# Patient Record
Sex: FEMALE | Race: WHITE
[De-identification: names, ages, dates, MRNs, and addresses within clinical notes are randomized per-mention and may not be internally consistent; named-entity substitution may affect disease eponyms.]

---

## 2017-05-16 ENCOUNTER — HOSPITAL ENCOUNTER (INPATIENT)
Dept: HOSPITAL 1 - ED | Age: 27
Discharge: LEFT BEFORE DISCHARGE | DRG: 225 | End: 2017-05-16
Attending: FAMILY MEDICINE | Admitting: FAMILY MEDICINE
Payer: COMMERCIAL

## 2017-05-16 ENCOUNTER — HOSPITAL ENCOUNTER (EMERGENCY)
Dept: HOSPITAL 26 - MED | Age: 27
Discharge: LEFT BEFORE DISCHARGE | End: 2017-05-16
Payer: COMMERCIAL

## 2017-05-16 VITALS — DIASTOLIC BLOOD PRESSURE: 66 MMHG | SYSTOLIC BLOOD PRESSURE: 104 MMHG

## 2017-05-16 VITALS — DIASTOLIC BLOOD PRESSURE: 63 MMHG | SYSTOLIC BLOOD PRESSURE: 115 MMHG

## 2017-05-16 VITALS — SYSTOLIC BLOOD PRESSURE: 115 MMHG | DIASTOLIC BLOOD PRESSURE: 63 MMHG

## 2017-05-16 VITALS — BODY MASS INDEX: 25.16 KG/M2 | WEIGHT: 160.3 LBS | HEIGHT: 67 IN

## 2017-05-16 VITALS — SYSTOLIC BLOOD PRESSURE: 101 MMHG | DIASTOLIC BLOOD PRESSURE: 58 MMHG

## 2017-05-16 VITALS — SYSTOLIC BLOOD PRESSURE: 104 MMHG | DIASTOLIC BLOOD PRESSURE: 66 MMHG

## 2017-05-16 VITALS — DIASTOLIC BLOOD PRESSURE: 71 MMHG | SYSTOLIC BLOOD PRESSURE: 113 MMHG

## 2017-05-16 VITALS — DIASTOLIC BLOOD PRESSURE: 70 MMHG | SYSTOLIC BLOOD PRESSURE: 116 MMHG

## 2017-05-16 VITALS — BODY MASS INDEX: 23.54 KG/M2 | WEIGHT: 150 LBS | HEIGHT: 67 IN

## 2017-05-16 DIAGNOSIS — Z53.21: ICD-10-CM

## 2017-05-16 DIAGNOSIS — F17.210: ICD-10-CM

## 2017-05-16 DIAGNOSIS — N17.0: ICD-10-CM

## 2017-05-16 DIAGNOSIS — K35.80: Primary | ICD-10-CM

## 2017-05-16 DIAGNOSIS — E87.6: ICD-10-CM

## 2017-05-16 DIAGNOSIS — F12.10: ICD-10-CM

## 2017-05-16 DIAGNOSIS — N39.0: ICD-10-CM

## 2017-05-16 DIAGNOSIS — R10.9: Primary | ICD-10-CM

## 2017-05-16 LAB
ALBUMIN SERPL-MCNC: 4.2 G/DL (ref 3.4–5)
ALP SERPL-CCNC: 85 U/L (ref 46–116)
ALT SERPL-CCNC: 14 U/L (ref 14–59)
AMPHETAMINES UR QL SCN: (no result)
AMYLASE SERPL-CCNC: 41 U/L (ref 25–115)
AST SERPL-CCNC: 12 U/L (ref 15–37)
BASOPHILS NFR BLD: 1.5 % (ref 0–2)
BILIRUB SERPL-MCNC: 0.62 MG/DL (ref 0.2–1)
BUN SERPL-MCNC: 11 MG/DL (ref 7–18)
CALCIUM SERPL-MCNC: 9.2 MG/DL (ref 8.5–10.1)
CHLORIDE SERPL-SCNC: 102 MMOL/L (ref 98–107)
CHOLEST SERPL-MCNC: 160 MG/DL (ref ?–200)
CHOLEST/HDLC SERPL: 1.9 MG/DL
CO2 SERPL-SCNC: 21.4 MMOL/L (ref 21–32)
CREAT SERPL-MCNC: 0.8 MG/DL (ref 0.6–1)
ERYTHROCYTE [DISTWIDTH] IN BLOOD BY AUTOMATED COUNT: 12.8 % (ref 11.5–14.5)
GFR SERPLBLD BASED ON 1.73 SQ M-ARVRAT: > 60 ML/MIN
GLUCOSE SERPL-MCNC: 108 MG/DL (ref 74–106)
HDLC SERPL-MCNC: 84 MG/DL (ref 40–60)
LIPASE SERPL-CCNC: 93 IU/L (ref 73–393)
MICROSCOPIC UR-IMP: YES
PLATELET # BLD: 183 X10^3MCL (ref 130–400)
POTASSIUM SERPL-SCNC: 3.1 MMOL/L (ref 3.5–5.1)
PROT SERPL-MCNC: 7.9 G/DL (ref 6.4–8.2)
RBC # UR STRIP.AUTO: NEGATIVE /UL
SODIUM SERPL-SCNC: 135 MMOL/L (ref 136–145)
T3 SERPL-MCNC: 1.27 NG/ML
T3RU NFR SERPL: 34 % UPTAKE (ref 30–39)
T4 FREE SERPL-MCNC: 1.19 NG/DL (ref 0.76–1.46)
T4 SERPL-MCNC: 7.4 UG/DL (ref 4.7–13.3)
T4/T3 UPTAKE INDEX SERPL: 2.5 UG/DL (ref 1.4–4.5)
TRIGL SERPL-MCNC: 123 MG/DL (ref ?–150)
UA SPECIFIC GRAVITY: 1.01 (ref 1–1.03)

## 2017-05-16 PROCEDURE — 0DTJ4ZZ RESECTION OF APPENDIX, PERCUTANEOUS ENDOSCOPIC APPROACH: ICD-10-PCS | Performed by: SURGERY

## 2017-05-16 NOTE — NUR
PT SEEN REST ON BED, NO COMPLAIN OF ABD PAIN, N/V. DR. WOLF JUST CHECKED
PT, NO MORE SURGERY. PT'S DIET IS CHANGED TO REG. PT BREATHING ON RA, EVEN,
UNLABORED. IV SITE PATENT, INTACT. IVF INFUSING WELL.

## 2017-05-16 NOTE — NUR
PT LAYING IN GURNEY WITH EYES CLOSED, RESP EVEN AND UNLABORED. PT SIGNIFICANT
OTHER AT BEDSIDE, NO DISTRESS NOTED

## 2017-05-16 NOTE — NUR
REC'D PT FROM [facility]. AAOX4. ABLE TO AMBULATE. PULSES ARE EVEN AND STRONG.
LUNG SOUNDS ARE DIMINISHED AT BASES BUT CTA, NO SOB, NO HA/D. DENIES PAIN AT
THIS TIME. BREATH SOUNDS ARE EVEN AND UNLABORED. TELE #5 SR. ABD IS SOFT AND
ROUND, ACTIVE 4. NO EDEMA NOTED. IV SITE INTACT AND PATENT.BED IN LOWEST
POSITION. CALL LIGHT WITHIN REACH. WILL CONTINUE TO MONITOR.

## 2017-05-16 NOTE — NUR
DR WOLF EXAMINED PT. PT DENIED PAIN.  STATED NO SURGERY. PT CAN HAVE
FOOD. WILL ENDORSE ALL CONTINUITY CARE TO AM NURSE.

## 2017-05-16 NOTE — NUR
WITH Pt. PATIENT LEAVING AMA. IV D/C'd, SKIN IS INTACT. Pt IS A/A/O
X4 DENIES ANY ABD PAIN, N/V/D OR ANY OTHER DISCOMFORT.

## 2017-05-16 NOTE — NUR
PT AMBULATE IN THE [place]. PT NO COMPLAIN OF PAIN ON ABD. PT REPORTED FEELING
MILD SORNESS ON PUBIC AREA WHILE URINATING. PT TOLERATE WELL ON REG DIET.

## 2017-12-05 ENCOUNTER — HOSPITAL ENCOUNTER (EMERGENCY)
Dept: HOSPITAL 1 - ED | Age: 27
Discharge: HOME | End: 2017-12-05
Payer: COMMERCIAL

## 2017-12-05 VITALS — SYSTOLIC BLOOD PRESSURE: 127 MMHG | DIASTOLIC BLOOD PRESSURE: 78 MMHG

## 2017-12-05 DIAGNOSIS — R10.13: Primary | ICD-10-CM

## 2017-12-05 LAB
ALBUMIN SERPL-MCNC: 3.9 G/DL (ref 3.4–5)
ALP SERPL-CCNC: 85 U/L (ref 46–116)
ALT SERPL-CCNC: 22 U/L (ref 14–59)
AMYLASE SERPL-CCNC: 37 U/L (ref 25–115)
AST SERPL-CCNC: 17 U/L (ref 15–37)
BASOPHILS NFR BLD: 0.4 % (ref 0–2)
BILIRUB SERPL-MCNC: 0.39 MG/DL (ref 0.2–1)
BUN SERPL-MCNC: 9 MG/DL (ref 7–18)
CALCIUM SERPL-MCNC: 9.2 MG/DL (ref 8.5–10.1)
CHLORIDE SERPL-SCNC: 103 MMOL/L (ref 98–107)
CO2 SERPL-SCNC: 27.8 MMOL/L (ref 21–32)
CREAT SERPL-MCNC: 0.7 MG/DL (ref 0.6–1)
ERYTHROCYTE [DISTWIDTH] IN BLOOD BY AUTOMATED COUNT: 14.1 % (ref 11.5–14.5)
GFR SERPLBLD BASED ON 1.73 SQ M-ARVRAT: > 60 ML/MIN
GLUCOSE SERPL-MCNC: 84 MG/DL (ref 74–106)
LIPASE SERPL-CCNC: 84 IU/L (ref 73–393)
PLATELET # BLD: 219 X10^3MCL (ref 130–400)
POTASSIUM SERPL-SCNC: 3 MMOL/L (ref 3.5–5.1)
PROT SERPL-MCNC: 8.1 G/DL (ref 6.4–8.2)
SODIUM SERPL-SCNC: 139 MMOL/L (ref 136–145)

## 2018-01-19 ENCOUNTER — HOSPITAL ENCOUNTER (EMERGENCY)
Dept: HOSPITAL 1 - ED | Age: 28
Discharge: HOME | End: 2018-01-19
Payer: COMMERCIAL

## 2018-01-19 VITALS — HEIGHT: 66 IN | WEIGHT: 146.98 LBS | BODY MASS INDEX: 23.62 KG/M2

## 2018-01-19 VITALS — DIASTOLIC BLOOD PRESSURE: 60 MMHG | SYSTOLIC BLOOD PRESSURE: 107 MMHG

## 2018-01-19 DIAGNOSIS — R10.13: Primary | ICD-10-CM

## 2018-01-19 DIAGNOSIS — R11.2: ICD-10-CM

## 2018-01-19 DIAGNOSIS — K21.9: ICD-10-CM

## 2018-01-19 DIAGNOSIS — F12.20: ICD-10-CM

## 2018-01-19 LAB
ALBUMIN SERPL-MCNC: 4.3 G/DL (ref 3.4–5)
ALP SERPL-CCNC: 95 U/L (ref 46–116)
ALT SERPL-CCNC: 15 U/L (ref 14–59)
AMYLASE SERPL-CCNC: 49 U/L (ref 25–115)
AST SERPL-CCNC: 12 U/L (ref 15–37)
BASOPHILS NFR BLD: 0.3 % (ref 0–2)
BILIRUB SERPL-MCNC: 0.39 MG/DL (ref 0.2–1)
BUN SERPL-MCNC: 10 MG/DL (ref 7–18)
CALCIUM SERPL-MCNC: 9.7 MG/DL (ref 8.5–10.1)
CHLORIDE SERPL-SCNC: 100 MMOL/L (ref 98–107)
CO2 SERPL-SCNC: 23.9 MMOL/L (ref 21–32)
CREAT SERPL-MCNC: 0.8 MG/DL (ref 0.6–1)
ERYTHROCYTE [DISTWIDTH] IN BLOOD BY AUTOMATED COUNT: 13.9 % (ref 11.5–14.5)
GFR SERPLBLD BASED ON 1.73 SQ M-ARVRAT: > 60 ML/MIN
GLUCOSE SERPL-MCNC: 111 MG/DL (ref 74–106)
LIPASE SERPL-CCNC: 95 IU/L (ref 73–393)
PLATELET # BLD: 232 X10^3MCL (ref 130–400)
POTASSIUM SERPL-SCNC: 3.4 MMOL/L (ref 3.5–5.1)
PROT SERPL-MCNC: 8.4 G/DL (ref 6.4–8.2)
SODIUM SERPL-SCNC: 136 MMOL/L (ref 136–145)

## 2018-12-04 ENCOUNTER — HOSPITAL ENCOUNTER (EMERGENCY)
Dept: HOSPITAL 1 - ED | Age: 28
Discharge: HOME | End: 2018-12-04
Payer: COMMERCIAL

## 2018-12-04 VITALS — DIASTOLIC BLOOD PRESSURE: 65 MMHG | SYSTOLIC BLOOD PRESSURE: 127 MMHG

## 2018-12-04 VITALS
WEIGHT: 149.12 LBS | BODY MASS INDEX: 22.6 KG/M2 | BODY MASS INDEX: 22.6 KG/M2 | HEIGHT: 68 IN | WEIGHT: 149.12 LBS | HEIGHT: 68 IN

## 2018-12-04 DIAGNOSIS — K21.9: ICD-10-CM

## 2018-12-04 DIAGNOSIS — R10.11: Primary | ICD-10-CM

## 2018-12-04 DIAGNOSIS — R10.31: ICD-10-CM

## 2018-12-04 LAB
ALBUMIN SERPL-MCNC: 3.8 G/DL (ref 3.4–5)
ALP SERPL-CCNC: 85 U/L (ref 46–116)
ALT SERPL-CCNC: 19 U/L (ref 14–59)
AST SERPL-CCNC: 13 U/L (ref 15–37)
BASOPHILS NFR BLD: 0.3 % (ref 0–2)
BILIRUB SERPL-MCNC: 0.3 MG/DL (ref 0.2–1)
BUN SERPL-MCNC: 10 MG/DL (ref 7–18)
CALCIUM SERPL-MCNC: 9.1 MG/DL (ref 8.5–10.1)
CHLORIDE SERPL-SCNC: 100 MMOL/L (ref 98–107)
CO2 SERPL-SCNC: 24.6 MMOL/L (ref 21–32)
CREAT SERPL-MCNC: 0.7 MG/DL (ref 0.6–1)
ERYTHROCYTE [DISTWIDTH] IN BLOOD BY AUTOMATED COUNT: 13.8 % (ref 11.5–14.5)
GFR SERPLBLD BASED ON 1.73 SQ M-ARVRAT: > 60 ML/MIN
GLUCOSE SERPL-MCNC: 117 MG/DL (ref 74–106)
LIPASE SERPL-CCNC: 75 IU/L (ref 73–393)
PLATELET # BLD: 283 X10^3MCL (ref 130–400)
POTASSIUM SERPL-SCNC: 3.5 MMOL/L (ref 3.5–5.1)
PROT SERPL-MCNC: 8 G/DL (ref 6.4–8.2)
SODIUM SERPL-SCNC: 136 MMOL/L (ref 136–145)

## 2018-12-05 ENCOUNTER — HOSPITAL ENCOUNTER (INPATIENT)
Dept: HOSPITAL 1 - ED | Age: 28
LOS: 1 days | Discharge: HOME | DRG: 224 | End: 2018-12-06
Attending: HOSPITALIST | Admitting: HOSPITALIST
Payer: COMMERCIAL

## 2018-12-05 VITALS — DIASTOLIC BLOOD PRESSURE: 99 MMHG | SYSTOLIC BLOOD PRESSURE: 106 MMHG

## 2018-12-05 VITALS — DIASTOLIC BLOOD PRESSURE: 67 MMHG | SYSTOLIC BLOOD PRESSURE: 114 MMHG

## 2018-12-05 VITALS — BODY MASS INDEX: 22.43 KG/M2 | HEIGHT: 68 IN | WEIGHT: 148 LBS

## 2018-12-05 VITALS — SYSTOLIC BLOOD PRESSURE: 123 MMHG | DIASTOLIC BLOOD PRESSURE: 74 MMHG

## 2018-12-05 VITALS — SYSTOLIC BLOOD PRESSURE: 99 MMHG | DIASTOLIC BLOOD PRESSURE: 63 MMHG

## 2018-12-05 DIAGNOSIS — F12.10: ICD-10-CM

## 2018-12-05 DIAGNOSIS — K21.9: ICD-10-CM

## 2018-12-05 DIAGNOSIS — K35.33: Primary | ICD-10-CM

## 2018-12-05 DIAGNOSIS — K66.0: ICD-10-CM

## 2018-12-05 LAB
ALBUMIN SERPL-MCNC: 3.9 G/DL (ref 3.4–5)
ALP SERPL-CCNC: 92 U/L (ref 46–116)
ALT SERPL-CCNC: 18 U/L (ref 14–59)
AST SERPL-CCNC: 9 U/L (ref 15–37)
BASOPHILS NFR BLD: 0.3 % (ref 0–2)
BILIRUB SERPL-MCNC: 0.77 MG/DL (ref 0.2–1)
BUN SERPL-MCNC: 6 MG/DL (ref 7–18)
CALCIUM SERPL-MCNC: 9 MG/DL (ref 8.5–10.1)
CHLORIDE SERPL-SCNC: 98 MMOL/L (ref 98–107)
CHOLEST SERPL-MCNC: 166 MG/DL (ref ?–200)
CHOLEST/HDLC SERPL: 1.8 MG/DL
CO2 SERPL-SCNC: 29.4 MMOL/L (ref 21–32)
CREAT SERPL-MCNC: 0.8 MG/DL (ref 0.6–1)
ERYTHROCYTE [DISTWIDTH] IN BLOOD BY AUTOMATED COUNT: 13 % (ref 11.5–14.5)
GFR SERPLBLD BASED ON 1.73 SQ M-ARVRAT: > 60 ML/MIN
GLUCOSE SERPL-MCNC: 94 MG/DL (ref 74–106)
HDLC SERPL-MCNC: 94 MG/DL (ref 40–60)
MAGNESIUM SERPL-MCNC: 1.8 MG/DL (ref 1.8–2.4)
PHOSPHATE SERPL-MCNC: 4 MG/DL (ref 2.5–4.9)
PLATELET # BLD: 261 X10^3MCL (ref 130–400)
POTASSIUM SERPL-SCNC: 3.7 MMOL/L (ref 3.5–5.1)
PROT SERPL-MCNC: 8.2 G/DL (ref 6.4–8.2)
SODIUM SERPL-SCNC: 135 MMOL/L (ref 136–145)
T3 SERPL-MCNC: 0.93 NG/ML
T3RU NFR SERPL: 34 % UPTAKE (ref 30–39)
T4 FREE SERPL-MCNC: 1 NG/DL (ref 0.76–1.46)
T4 SERPL-MCNC: 6.8 UG/DL (ref 4.7–13.3)
T4/T3 UPTAKE INDEX SERPL: 2.3 UG/DL (ref 1.4–4.5)
TRIGL SERPL-MCNC: 39 MG/DL (ref ?–150)

## 2018-12-05 PROCEDURE — 0DNH4ZZ RELEASE CECUM, PERCUTANEOUS ENDOSCOPIC APPROACH: ICD-10-PCS

## 2018-12-05 PROCEDURE — 0DTJ4ZZ RESECTION OF APPENDIX, PERCUTANEOUS ENDOSCOPIC APPROACH: ICD-10-PCS

## 2018-12-05 PROCEDURE — 0WJG4ZZ INSPECTION OF PERITONEAL CAVITY, PERCUTANEOUS ENDOSCOPIC APPROACH: ICD-10-PCS

## 2018-12-06 VITALS — DIASTOLIC BLOOD PRESSURE: 55 MMHG | SYSTOLIC BLOOD PRESSURE: 100 MMHG

## 2018-12-06 VITALS — DIASTOLIC BLOOD PRESSURE: 54 MMHG | SYSTOLIC BLOOD PRESSURE: 91 MMHG

## 2018-12-06 LAB
BASOPHILS NFR BLD: 0.2 % (ref 0–2)
BUN SERPL-MCNC: 12 MG/DL (ref 7–18)
CALCIUM SERPL-MCNC: 8.6 MG/DL (ref 8.5–10.1)
CHLORIDE SERPL-SCNC: 100 MMOL/L (ref 98–107)
CO2 SERPL-SCNC: 24.4 MMOL/L (ref 21–32)
CREAT SERPL-MCNC: 0.6 MG/DL (ref 0.6–1)
ERYTHROCYTE [DISTWIDTH] IN BLOOD BY AUTOMATED COUNT: 13.9 % (ref 11.5–14.5)
GFR SERPLBLD BASED ON 1.73 SQ M-ARVRAT: > 60 ML/MIN
GLUCOSE SERPL-MCNC: 96 MG/DL (ref 74–106)
PLATELET # BLD: 213 X10^3MCL (ref 130–400)
POTASSIUM SERPL-SCNC: 3.8 MMOL/L (ref 3.5–5.1)
SODIUM SERPL-SCNC: 133 MMOL/L (ref 136–145)

## 2018-12-13 ENCOUNTER — HOSPITAL ENCOUNTER (EMERGENCY)
Dept: HOSPITAL 1 - ED | Age: 28
Discharge: HOME | End: 2018-12-13
Payer: COMMERCIAL

## 2018-12-13 VITALS — WEIGHT: 147 LBS | BODY MASS INDEX: 22.28 KG/M2 | HEIGHT: 68 IN

## 2018-12-13 VITALS — DIASTOLIC BLOOD PRESSURE: 79 MMHG | SYSTOLIC BLOOD PRESSURE: 119 MMHG

## 2018-12-13 DIAGNOSIS — Z90.89: ICD-10-CM

## 2018-12-13 DIAGNOSIS — Z48.01: Primary | ICD-10-CM

## 2018-12-13 DIAGNOSIS — K21.9: ICD-10-CM
